# Patient Record
Sex: FEMALE | Race: WHITE | URBAN - METROPOLITAN AREA
[De-identification: names, ages, dates, MRNs, and addresses within clinical notes are randomized per-mention and may not be internally consistent; named-entity substitution may affect disease eponyms.]

---

## 2017-06-17 ENCOUNTER — HOSPITAL ENCOUNTER (EMERGENCY)
Facility: CLINIC | Age: 82
Discharge: HOME | End: 2017-06-18
Attending: EMERGENCY MEDICINE | Admitting: EMERGENCY MEDICINE
Payer: MEDICARE

## 2017-06-17 DIAGNOSIS — R79.1 SUBTHERAPEUTIC INTERNATIONAL NORMALIZED RATIO (INR): ICD-10-CM

## 2017-06-17 DIAGNOSIS — I48.0 PAROXYSMAL ATRIAL FIBRILLATION (H): ICD-10-CM

## 2017-06-17 LAB
ANION GAP SERPL CALCULATED.3IONS-SCNC: 6 MMOL/L (ref 3–14)
BASOPHILS # BLD AUTO: 0 10E9/L (ref 0–0.2)
BASOPHILS NFR BLD AUTO: 0.6 %
BUN SERPL-MCNC: 20 MG/DL (ref 7–30)
CALCIUM SERPL-MCNC: 8.9 MG/DL (ref 8.5–10.1)
CHLORIDE SERPL-SCNC: 98 MMOL/L (ref 94–109)
CO2 SERPL-SCNC: 26 MMOL/L (ref 20–32)
CREAT SERPL-MCNC: 1.05 MG/DL (ref 0.52–1.04)
DIFFERENTIAL METHOD BLD: ABNORMAL
EOSINOPHIL # BLD AUTO: 0.1 10E9/L (ref 0–0.7)
EOSINOPHIL NFR BLD AUTO: 2.4 %
ERYTHROCYTE [DISTWIDTH] IN BLOOD BY AUTOMATED COUNT: 13.2 % (ref 10–15)
GFR SERPL CREATININE-BSD FRML MDRD: 50 ML/MIN/1.7M2
GLUCOSE SERPL-MCNC: 95 MG/DL (ref 70–99)
HCT VFR BLD AUTO: 31.8 % (ref 35–47)
HGB BLD-MCNC: 10.7 G/DL (ref 11.7–15.7)
IMM GRANULOCYTES # BLD: 0 10E9/L (ref 0–0.4)
IMM GRANULOCYTES NFR BLD: 0.2 %
INR PPP: 1.74 (ref 0.86–1.14)
LYMPHOCYTES # BLD AUTO: 2.2 10E9/L (ref 0.8–5.3)
LYMPHOCYTES NFR BLD AUTO: 44.1 %
MCH RBC QN AUTO: 35.3 PG (ref 26.5–33)
MCHC RBC AUTO-ENTMCNC: 33.6 G/DL (ref 31.5–36.5)
MCV RBC AUTO: 105 FL (ref 78–100)
MONOCYTES # BLD AUTO: 0.4 10E9/L (ref 0–1.3)
MONOCYTES NFR BLD AUTO: 8 %
NEUTROPHILS # BLD AUTO: 2.2 10E9/L (ref 1.6–8.3)
NEUTROPHILS NFR BLD AUTO: 44.7 %
NRBC # BLD AUTO: 0 10*3/UL
NRBC BLD AUTO-RTO: 0 /100
PLATELET # BLD AUTO: 224 10E9/L (ref 150–450)
POTASSIUM SERPL-SCNC: 3.9 MMOL/L (ref 3.4–5.3)
RBC # BLD AUTO: 3.03 10E12/L (ref 3.8–5.2)
SODIUM SERPL-SCNC: 130 MMOL/L (ref 133–144)
TROPONIN I SERPL-MCNC: NORMAL UG/L (ref 0–0.04)
TSH SERPL DL<=0.005 MIU/L-ACNC: 2.22 MU/L (ref 0.4–4)
WBC # BLD AUTO: 5 10E9/L (ref 4–11)

## 2017-06-17 PROCEDURE — 84443 ASSAY THYROID STIM HORMONE: CPT | Performed by: EMERGENCY MEDICINE

## 2017-06-17 PROCEDURE — 80048 BASIC METABOLIC PNL TOTAL CA: CPT | Performed by: EMERGENCY MEDICINE

## 2017-06-17 PROCEDURE — 85025 COMPLETE CBC W/AUTO DIFF WBC: CPT | Performed by: EMERGENCY MEDICINE

## 2017-06-17 PROCEDURE — 93005 ELECTROCARDIOGRAM TRACING: CPT

## 2017-06-17 PROCEDURE — 96360 HYDRATION IV INFUSION INIT: CPT

## 2017-06-17 PROCEDURE — 99284 EMERGENCY DEPT VISIT MOD MDM: CPT | Mod: 25

## 2017-06-17 PROCEDURE — 25000128 H RX IP 250 OP 636: Performed by: EMERGENCY MEDICINE

## 2017-06-17 PROCEDURE — 96361 HYDRATE IV INFUSION ADD-ON: CPT

## 2017-06-17 PROCEDURE — 84484 ASSAY OF TROPONIN QUANT: CPT | Performed by: EMERGENCY MEDICINE

## 2017-06-17 PROCEDURE — 85610 PROTHROMBIN TIME: CPT | Performed by: EMERGENCY MEDICINE

## 2017-06-17 PROCEDURE — 93005 ELECTROCARDIOGRAM TRACING: CPT | Mod: 76

## 2017-06-17 RX ORDER — SODIUM CHLORIDE 9 MG/ML
1000 INJECTION, SOLUTION INTRAVENOUS CONTINUOUS
Status: DISCONTINUED | OUTPATIENT
Start: 2017-06-17 | End: 2017-06-18 | Stop reason: HOSPADM

## 2017-06-17 RX ORDER — LIDOCAINE 40 MG/G
CREAM TOPICAL
Status: DISCONTINUED | OUTPATIENT
Start: 2017-06-17 | End: 2017-06-18 | Stop reason: HOSPADM

## 2017-06-17 RX ADMIN — SODIUM CHLORIDE 1000 ML: 9 INJECTION, SOLUTION INTRAVENOUS at 23:08

## 2017-06-17 ASSESSMENT — ENCOUNTER SYMPTOMS
DIARRHEA: 0
PALPITATIONS: 1
COUGH: 0
FEVER: 0
NAUSEA: 0
VOMITING: 0
SHORTNESS OF BREATH: 0

## 2017-06-17 NOTE — ED AVS SNAPSHOT
[facility] Emergency Department    [address]    [facility] [address]    Phone:  [phone]    Fax:  [phone]                                       Bianca Reyna   MRN: [id number]    Department:  [facility] Emergency Department   Date of Visit:  6/17/2017           After Visit Summary Signature Page     I have received my discharge instructions, and my questions have been answered. I have discussed any challenges I see with this plan with the nurse or doctor.    ..........................................................................................................................................  Patient/Patient Representative Signature      ..........................................................................................................................................  Patient Representative Print Name and Relationship to Patient    ..................................................               ................................................  Date                                            Time    ..........................................................................................................................................  Reviewed by Signature/Title    ...................................................              ..............................................  Date                                                            Time

## 2017-06-17 NOTE — ED AVS SNAPSHOT
[facility] Emergency Department    [address] [place] [address]    Phone:  [phone]    Fax:  [phone]                                       Bianca Reyna   MRN: [id number]    Department:  [facility] Emergency Department   Date of Visit:  6/17/2017           Patient Information     Date Of Birth          1935        Your diagnoses for this visit were:     Paroxysmal atrial fibrillation (H)     Subtherapeutic international normalized ratio (INR)        You were seen by Henok Liu MD and Dg Baig MD.      Follow-up Information     Follow up with Cardiology as discussed. .        Discharge Instructions         Understanding Atrial Fibrillation    An arrhythmia is any problem with the speed or pattern of the heartbeat. Atrial fibrillation (AFib) is a common type of arrhythmia. It causes fast, chaotic electrical signals in the atria. This leads to poor functioning of the heart. It also affects how much blood your heart can pump out to the body.  Afib may occur once in a while and go away on its own. Or it may continue for longer periods and need treatment.  AFib can lead to serious problems, such as stroke. Your healthcare provider will need to monitor and manage it.  What happens during atrial fibrillation?   The heart has an electrical system that sends signals to control the heartbeat. As signals move through the heart, they tell the heart s upper chambers (atria) and lower chambers (ventricles) when to squeeze (contract) and relax. This lets blood move through the heart and out to the body and lungs.  With AFib, the atria receive abnormal signals. This causes them to contract in a fast and irregular way, and out of sync with the ventricles. When this happens, the atria also have a harder time moving blood into the ventricles. Blood may then pool in the atria, which increases the risk for blood clots and stroke. The ventricles also may  contract too quickly and irregularly. As a result, they may not pump blood to the body and lungs as well as they should. This can weaken the heart muscle over time and cause heart failure.  What causes atrial fibrillation?  AFib is more common in older adults. It has many possible causes including:    Coronary artery disease    Heart valve disease    Heart attack    Heart surgery    High blood pressure    Thyroid disease    Diabetes    Lung disease    Sleep apnea    Heavy alcohol use  In some cases of AFib, doctors do not know the cause.  What are the symptoms of atrial fibrillation?  AFib may or may not cause symptoms. If symptoms do occur, they may include:    A fast, pounding, irregular heartbeat    Shortness of breath    Tiredness    Dizziness or fainting    Chest pain  How is atrial fibrillation treated?  Treatments for AFib can include any of the options below.    Medicines. You may be prescribed:    Heart rate medicines to help slow down the heartbeat    Heart rhythm medicines to help the heart beat more regularly    Anti-clotting medicines to help reduce the risk for blood clots and stroke    Electrical cardioversion. Your healthcare provider uses special pads or paddles to send one or more brief electrical shocks to the heart. This can help reset the heartbeat to normal.    Ablation. Long, thin tubes called catheters are threaded through a blood vessel to the heart. There, the catheters send out hot or cold energy to the areas causing the abnormal signals. This energy destroys the problem tissue or cells. This improves the chances that your heart will stay in normal rhythm without using medicines. If your heart rate and rhythm can t be controlled, you may need ablation and a pacemaker. These will help control the heart rate and regularity of the heartbeat.    Surgery. During surgery, your healthcare provider may use different methods to create scar tissue in the areas of the heart causing the abnormal  signals. The scar tissue disrupts the abnormal signals and may stop AFib from occurring.  What are the complications of atrial fibrillation?  These can include:    Blood clots    Stroke    Heart failure. This problem occurs when the heart muscle weakens so much that it can no longer pump blood well.  When should I call my healthcare provider?  Call your healthcare provider right away if you have any of these:    Symptoms that don t get better with treatment, or get worse    New symptoms   Date Last Reviewed: 5/1/2016 2000-2017 [employer]. [address] [place] [address]. All rights reserved. This information is not intended as a substitute for professional medical care. Always follow your healthcare professional's instructions.          24 Hour Appointment Hotline       To make an appointment at any [facility] call [phone] ([phone]). If you don't have a family doctor or clinic, we will help you find one. [place] clinics are conveniently located to serve the needs of you and your family.             Review of your medicines      Our records show that you are taking the medicines listed below. If these are incorrect, please call your family doctor or clinic.        Dose / Directions Last dose taken    calcium + D 600-200 MG-UNIT Tabs   Quantity:  30   Generic drug:  calcium carbonate-vitamin D        3 tabs daily   Refills:  0        CLONIDINE HCL PO   Dose:  0.1 mg        Take 0.1 mg by mouth as needed   Refills:  0        LASIX 20 MG tablet   Quantity:  30   Generic drug:  furosemide        1/2 tablet or one 10mg tablet daily prn leg swelling   Refills:  5        LISINOPRIL PO   Dose:  20 mg        Take 20 mg by mouth 2 times daily   Refills:  0        MIDODRINE HCL PO   Dose:  5 mg        Take 5 mg by mouth 2 times daily   Refills:  0        Multiple vitamin Caps        Refills:  0        PROPRANOLOL HCL PO   Dose:  20 mg        Take 20 mg by mouth 3 times daily    Refills:  0        WARFARIN SODIUM PO   Dose:  2.5 mg        Take 2.5 mg by mouth daily   Refills:  0                Procedures and tests performed during your visit     Procedure/Test Number of Times Performed    Basic metabolic panel 1    CBC with platelets differential 1    Cardiac Continuous Monitoring 1    EKG 12 lead 2    INR 1    Peripheral IV catheter 1    TSH with free T4 reflex 1    Troponin I 1    Troponin I (now) 1      Orders Needing Specimen Collection     None      Pending Results     Date and Time Order Name Status Description    6/17/2017 2247 EKG 12 lead Preliminary             Pending Culture Results     No orders found for last 3 day(s).            Pending Results Instructions     If you had any lab results that were not finalized at the time of your Discharge, you can call the ED Lab Result RN at [phone]. You will be contacted by this team for any positive Lab results or changes in treatment. The nurses are available 7 days a week from 10A to 6:30P.  You can leave a message 24 hours per day and they will return your call.        Test Results From Your Hospital Stay        6/17/2017 11:12 PM      Component Results     Component Value Ref Range & Units Status    WBC 5.0 4.0 - 11.0 10e9/L Final    RBC Count 3.03 (L) 3.8 - 5.2 10e12/L Final    Hemoglobin 10.7 (L) 11.7 - 15.7 g/dL Final    Hematocrit 31.8 (L) 35.0 - 47.0 % Final     (H) 78 - 100 fl Final    MCH 35.3 (H) 26.5 - 33.0 pg Final    MCHC 33.6 31.5 - 36.5 g/dL Final    RDW 13.2 10.0 - 15.0 % Final    Platelet Count 224 150 - 450 10e9/L Final    Diff Method Automated Method  Final    % Neutrophils 44.7 % Final    % Lymphocytes 44.1 % Final    % Monocytes 8.0 % Final    % Eosinophils 2.4 % Final    % Basophils 0.6 % Final    % Immature Granulocytes 0.2 % Final    Nucleated RBCs 0 0 /100 Final    Absolute Neutrophil 2.2 1.6 - 8.3 10e9/L Final    Absolute Lymphocytes 2.2 0.8 - 5.3 10e9/L Final    Absolute Monocytes 0.4 0.0 - 1.3  10e9/L Final    Absolute Eosinophils 0.1 0.0 - 0.7 10e9/L Final    Absolute Basophils 0.0 0.0 - 0.2 10e9/L Final    Abs Immature Granulocytes 0.0 0 - 0.4 10e9/L Final    Absolute Nucleated RBC 0.0  Final         6/17/2017 11:32 PM      Component Results     Component Value Ref Range & Units Status    Sodium 130 (L) 133 - 144 mmol/L Final    Potassium 3.9 3.4 - 5.3 mmol/L Final    Chloride 98 94 - 109 mmol/L Final    Carbon Dioxide 26 20 - 32 mmol/L Final    Anion Gap 6 3 - 14 mmol/L Final    Glucose 95 70 - 99 mg/dL Final    Urea Nitrogen 20 7 - 30 mg/dL Final    Creatinine 1.05 (H) 0.52 - 1.04 mg/dL Final    GFR Estimate 50 (L) >60 mL/min/1.7m2 Final    Non  GFR Calc    GFR Estimate If Black 61 >60 mL/min/1.7m2 Final    African American GFR Calc    Calcium 8.9 8.5 - 10.1 mg/dL Final         6/17/2017 11:37 PM      Component Results     Component Value Ref Range & Units Status    TSH 2.22 0.40 - 4.00 mU/L Final         6/17/2017 11:32 PM      Component Results     Component Value Ref Range & Units Status    Troponin I ES  0.000 - 0.045 ug/L Final    <0.015  The 99th percentile for upper reference range is 0.045 ug/L.  Troponin values in   the range of 0.045 - 0.120 ug/L may be associated with risks of adverse   clinical events.           6/17/2017 11:42 PM      Component Results     Component Value Ref Range & Units Status    INR 1.74 (H) 0.86 - 1.14 Final         6/18/2017  1:30 AM      Component Results     Component Value Ref Range & Units Status    Troponin I ES  0.000 - 0.045 ug/L Final    <0.015  The 99th percentile for upper reference range is 0.045 ug/L.  Troponin values in   the range of 0.045 - 0.120 ug/L may be associated with risks of adverse   clinical events.                  Clinical Quality Measure: Blood Pressure Screening     Your blood pressure was checked while you were in the emergency department today. The last reading we obtained was  BP: 124/58 . Please read the guidelines  "below about what these numbers mean and what you should do about them.  If your systolic blood pressure (the top number) is less than 120 and your diastolic blood pressure (the bottom number) is less than 80, then your blood pressure is normal. There is nothing more that you need to do about it.  If your systolic blood pressure (the top number) is 120-139 or your diastolic blood pressure (the bottom number) is 80-89, your blood pressure may be higher than it should be. You should have your blood pressure rechecked within a year by a primary care provider.  If your systolic blood pressure (the top number) is 140 or greater or your diastolic blood pressure (the bottom number) is 90 or greater, you may have high blood pressure. High blood pressure is treatable, but if left untreated over time it can put you at risk for heart attack, stroke, or kidney failure. You should have your blood pressure rechecked by a primary care provider within the next 4 weeks.  If your provider in the emergency department today gave you specific instructions to follow-up with your doctor or provider even sooner than that, you should follow that instruction and not wait for up to 4 weeks for your follow-up visit.        Thank you for choosing [facility]       Thank you for choosing [facility] for your care. Our goal is always to provide you with excellent care. Hearing back from our patients is one way we can continue to improve our services. Please take a few minutes to complete the written survey that you may receive in the mail after you visit with us. Thank you!        [email or web address] Information     [employer] lets you send messages to your doctor, view your test results, renew your prescriptions, schedule appointments and more. To sign up, go to [email or web address] . Click on \"Log in\" on the left side of the screen, which will take you to the Welcome page. Then click on \"Sign up Now\" on the right side of the page.     You will be asked to enter " the access code listed below, as well as some personal information. Please follow the directions to create your username and password.     Your access code is: [email or web address]  Expires: 2017  2:22 AM     Your access code will  in 90 days. If you need help or a new code, please call your [facility] clinic or [phone].        Care EveryWhere ID     This is your Care EveryWhere ID. This could be used by other organizations to access your [facility] medical records  [id number]        After Visit Summary       This is your record. Keep this with you and show to your community pharmacist(s) and doctor(s) at your next visit.

## 2017-06-18 VITALS
OXYGEN SATURATION: 98 % | SYSTOLIC BLOOD PRESSURE: 134 MMHG | RESPIRATION RATE: 16 BRPM | TEMPERATURE: 98.3 F | DIASTOLIC BLOOD PRESSURE: 62 MMHG | HEART RATE: 108 BPM | WEIGHT: 166 LBS

## 2017-06-18 LAB
INTERPRETATION ECG - MUSE: NORMAL
INTERPRETATION ECG - MUSE: NORMAL
TROPONIN I SERPL-MCNC: NORMAL UG/L (ref 0–0.04)

## 2017-06-18 PROCEDURE — 84484 ASSAY OF TROPONIN QUANT: CPT | Performed by: EMERGENCY MEDICINE

## 2017-06-18 PROCEDURE — 36415 COLL VENOUS BLD VENIPUNCTURE: CPT | Performed by: EMERGENCY MEDICINE

## 2017-06-18 NOTE — DISCHARGE INSTRUCTIONS
Understanding Atrial Fibrillation    An arrhythmia is any problem with the speed or pattern of the heartbeat. Atrial fibrillation (AFib) is a common type of arrhythmia. It causes fast, chaotic electrical signals in the atria. This leads to poor functioning of the heart. It also affects how much blood your heart can pump out to the body.  Afib may occur once in a while and go away on its own. Or it may continue for longer periods and need treatment.  AFib can lead to serious problems, such as stroke. Your healthcare provider will need to monitor and manage it.  What happens during atrial fibrillation?   The heart has an electrical system that sends signals to control the heartbeat. As signals move through the heart, they tell the heart s upper chambers (atria) and lower chambers (ventricles) when to squeeze (contract) and relax. This lets blood move through the heart and out to the body and lungs.  With AFib, the atria receive abnormal signals. This causes them to contract in a fast and irregular way, and out of sync with the ventricles. When this happens, the atria also have a harder time moving blood into the ventricles. Blood may then pool in the atria, which increases the risk for blood clots and stroke. The ventricles also may contract too quickly and irregularly. As a result, they may not pump blood to the body and lungs as well as they should. This can weaken the heart muscle over time and cause heart failure.  What causes atrial fibrillation?  AFib is more common in older adults. It has many possible causes including:    Coronary artery disease    Heart valve disease    Heart attack    Heart surgery    High blood pressure    Thyroid disease    Diabetes    Lung disease    Sleep apnea    Heavy alcohol use  In some cases of AFib, doctors do not know the cause.  What are the symptoms of atrial fibrillation?  AFib may or may not cause symptoms. If symptoms do occur, they may include:    A fast, pounding,  irregular heartbeat    Shortness of breath    Tiredness    Dizziness or fainting    Chest pain  How is atrial fibrillation treated?  Treatments for AFib can include any of the options below.    Medicines. You may be prescribed:    Heart rate medicines to help slow down the heartbeat    Heart rhythm medicines to help the heart beat more regularly    Anti-clotting medicines to help reduce the risk for blood clots and stroke    Electrical cardioversion. Your healthcare provider uses special pads or paddles to send one or more brief electrical shocks to the heart. This can help reset the heartbeat to normal.    Ablation. Long, thin tubes called catheters are threaded through a blood vessel to the heart. There, the catheters send out hot or cold energy to the areas causing the abnormal signals. This energy destroys the problem tissue or cells. This improves the chances that your heart will stay in normal rhythm without using medicines. If your heart rate and rhythm can t be controlled, you may need ablation and a pacemaker. These will help control the heart rate and regularity of the heartbeat.    Surgery. During surgery, your healthcare provider may use different methods to create scar tissue in the areas of the heart causing the abnormal signals. The scar tissue disrupts the abnormal signals and may stop AFib from occurring.  What are the complications of atrial fibrillation?  These can include:    Blood clots    Stroke    Heart failure. This problem occurs when the heart muscle weakens so much that it can no longer pump blood well.  When should I call my healthcare provider?  Call your healthcare provider right away if you have any of these:    Symptoms that don t get better with treatment, or get worse    New symptoms   Date Last Reviewed: 5/1/2016 2000-2017 [employer]. [address] [place] [address], [place] [address]. All rights reserved. This information is not intended as a substitute for professional  medical care. Always follow your healthcare professional's instructions.

## 2017-06-18 NOTE — ED PROVIDER NOTES
History     Chief Complaint:  Palpitations    HPI   Bianca Reyna is a 82 year old female who presents with palpitations. Bianca has a past medical history significant for atrial fibrillation and myocardial infarction. The patient developed heart palpitations around 1800 this evening. Due to her subjective hypertensive status and past cardiac history, the patient decided to come into the ER for evaluation. Here in the ER, the patient reports having a stent placed in 2014 and one previous hospitalization for her atrial fibrillation in 2015; Bianca did not undergo electric cardioversion for that 2015 atrial fibrillation bout, but she was hospitalized and administered medication. She denies chest pain, shortness of breath, fevers, cough, nausea, vomiting and diarrhea. She has not contracted any recent illnesses and the patient denies any current feelings of palpitations. Bianca is visiting the [facility] until 06-29 from out of state. Of note, she obtained a negative heart and neck ultrasound two weeks ago in [place] (results not available to review).     Cardiac Risk Factors:  CAD:    neg  Hypertension:   pos  Hyperlipidemia:  neg  Diabetes:   neg  Tobacco use:   Pos (quit)  Gender:   female  Age:    81 yo  Familial Hx of CAD:  neg    Allergies:  Sulfa drugs     Medications:    Warfarin  Midodrine  Lisinopril  Clonidine  Propranolol  Lasix     Past Medical History:    Colonic polyps  Osteopenia  Atrial fibrillation  CAD  Osteopenia    Past Surgical History:    Colonoscopy   Tonsillectomy  Coronary stent    Family History:    Cerebrovascular disease  Colorectal cancer  Lupus    Social History:  Relationship status:   Tobacco use: pos (quit 01/01/1990)  Alcohol use: pos  The patient presents with her daughter and son-in-law.      Review of Systems   Constitutional: Negative for fever.   Respiratory: Negative for cough and shortness of breath.    Cardiovascular: Positive for palpitations. Negative for chest pain.    Gastrointestinal: Negative for diarrhea, nausea and vomiting.   All other systems reviewed and are negative.    Physical Exam     Patient Vitals for the past 24 hrs:   BP Temp Temp src Pulse Heart Rate Resp SpO2 Weight   06/18/17 0230 134/62 - - - 62 16 98 % -   06/18/17 0200 124/58 - - - 59 - 99 % -   06/18/17 0145 - - - - 63 - 98 % -   06/18/17 0130 127/59 - - - 61 - 98 % -   06/18/17 0115 105/50 - - - 81 - 100 % -   06/18/17 0045 117/80 - - - 91 - 99 % -   06/18/17 0030 129/80 - - - 100 - 99 % -   06/18/17 0015 132/84 - - - 103 - 100 % -   06/17/17 2345 142/78 - - - 83 - 100 % -   06/17/17 2330 139/78 - - - 86 - 100 % -   06/17/17 2315 101/63 - - - 81 - 99 % -   06/17/17 2300 121/75 - - - 86 - 99 % -   06/17/17 2245 133/83 - - - 87 - 100 % -   06/17/17 2233 (!) 148/92 98.3  F (36.8  C) Oral 108 - 18 100 % 75.3 kg (166 lb)      Physical Exam  Nursing note and vitals reviewed.  Constitutional: Cooperative.   HENT:   Mouth/Throat: Mucous membranes are normal.   Cardiovascular: Irregularly regular rhythm. Normal rate. Normal heart sounds.  No murmur.  Pulmonary/Chest: Effort normal and breath sounds normal. No respiratory distress. No wheezes. No rales.   Abdominal: Soft. Normal appearance and bowel sounds are normal. No distension. There is no tenderness. There is no rigidity and no guarding.   Musculoskeletal: No LE edema  Neurological: Alert. Oriented x4.   Skin: Skin is warm and dry. No rash noted.   Psychiatric: Normal mood and affect.     Emergency Department Course   ECG @ 2237  Indication: palpitations  Rate 91 bpm.   ME interval - ms.   QRS duration 116 ms.   QT/QTc 374/460 ms.   P-R-T axes -49.  Notes: Atrial fibrillation. Left anterior fascicular block. Left ventricular hypertrophy with QRS widening.  Time read 2325     Repeat ECG @ 0217  Indication: palpitations, afib  Rate 60 bpm.   ME interval 166 ms.   QRS duration 120 ms.   QT/QTc 456/456 ms.   P-R-T axes -44.  Notes: Normal sinus rhythm. Left axis  deviation. Left ventricular hypertrophy with QRS widening.  Time read 0221     Laboratory:  CBC: (WBC 5.0, HGB 10.7 (L), )   BMP:  (L), creatinine 1.05 (H), GFR estimate 50 (L), o/w WNL    TSH with free T4 reflex: 2.22    2332: Troponin I: <0.015  0130: Troponin I: <0.015    2342: INR: 1.74     Interventions:  2308: Normal saline, 1000 mL, IV    Emergency Department Course:  Nursing notes and vitals reviewed.  I performed an exam of the patient as documented above.  The above workup was undertaken.  0217: I rechecked the patient and discussed results. The patient appeared to have converted to a sinus rhythm spontaneously. I will have a second EKG obtained to confirm this before sending the patient home.   Findings and plan explained to the Patient. Patient discharged home with instructions regarding supportive care, medications, and reasons to return. The importance of close follow-up was reviewed.  Impression & Plan      Medical Decision Making:  Bianca Reyna is a 82 year old female with CAD and paroxysmal atrial fibrillation who presents to the ER with heart fluttering. She was found to be in rate controlled atrial fibrillation here. She had no other concerning symptoms including shortness of breath or chest pain. Serial troponins are negative, chest Xray was clear and her lab work was otherwise reassuring. Without intervention she converted back to normal sinus rhythm. She is slightly sub therapeutic on her INR without indication for admission at this time. At this point, it is unlikely to represent pulmonary embolism. It sounds like she has been working to get her INR under better control having just decreased her Coumadin level. Since she is out of town, we have referred her to our local [place] [facility] group and she is aware that the emergency department is always available. Otherwise we will have her follow up with neurology back home in [place].      Diagnosis:    ICD-10-CM   1. Paroxysmal atrial  fibrillation (H) I48.0   2. Subtherapeutic international normalized ratio (INR) R79.1     Disposition:  Discharge to home with neurology follow up either in [place] or back home in [place].    I, Bal Guerrero, am serving as a scribe on 6/17/2017 at 11:34 PM to personally document services performed by Dg Baig MD, based on my observations and the provider's statements to me.    [facility] EMERGENCY DEPARTMENT       Dg Baig MD  06/18/17 0513

## 2017-06-18 NOTE — ED NOTES
Patient presents with a concern for palpitations that started this evening 1800. Patient denies have these feeling recently, but states she has been in Afib in the past. Patient states she also previously had an MI in 2014 in [place]. ABC mukesh, A&Ox4.